# Patient Record
Sex: FEMALE | Race: BLACK OR AFRICAN AMERICAN | NOT HISPANIC OR LATINO | Employment: FULL TIME | URBAN - METROPOLITAN AREA
[De-identification: names, ages, dates, MRNs, and addresses within clinical notes are randomized per-mention and may not be internally consistent; named-entity substitution may affect disease eponyms.]

---

## 2018-09-17 ENCOUNTER — CLINICAL SUPPORT (OUTPATIENT)
Dept: URGENT CARE | Facility: CLINIC | Age: 28
End: 2018-09-17

## 2018-09-17 DIAGNOSIS — Z00.00 PHYSICAL EXAM: Primary | ICD-10-CM

## 2018-09-17 PROCEDURE — 86580 TB INTRADERMAL TEST: CPT | Mod: S$GLB,,, | Performed by: PREVENTIVE MEDICINE

## 2018-09-17 PROCEDURE — 90715 TDAP VACCINE 7 YRS/> IM: CPT | Mod: S$GLB,,, | Performed by: PREVENTIVE MEDICINE

## 2018-09-17 PROCEDURE — 86706 HEP B SURFACE ANTIBODY: CPT | Mod: S$GLB,,, | Performed by: NURSE PRACTITIONER

## 2018-09-17 PROCEDURE — 99002 DEVICE HANDLING PHYS/QHP: CPT | Mod: S$GLB,,, | Performed by: NURSE PRACTITIONER

## 2018-09-17 PROCEDURE — 86799 MMRV TITER: CPT | Mod: S$GLB,,, | Performed by: NURSE PRACTITIONER

## 2018-09-17 PROCEDURE — 99080 SPECIAL REPORTS OR FORMS: CPT | Mod: S$GLB,,, | Performed by: NURSE PRACTITIONER

## 2018-09-20 LAB
TB INDURATION - 48 HR READ: NORMAL MM
TB INDURATION - 72 HR READ: NORMAL MM
TB SKIN TEST - 48 HR READ: NORMAL
TB SKIN TEST - 72 HR READ: NEGATIVE

## 2018-09-24 ENCOUNTER — CLINICAL SUPPORT (OUTPATIENT)
Dept: URGENT CARE | Facility: CLINIC | Age: 28
End: 2018-09-24

## 2018-09-24 DIAGNOSIS — Z23 ENCOUNTER FOR IMMUNIZATION: Primary | ICD-10-CM

## 2018-09-24 PROCEDURE — 90746 HEPB VACCINE 3 DOSE ADULT IM: CPT | Mod: S$GLB,,, | Performed by: PREVENTIVE MEDICINE

## 2018-09-24 PROCEDURE — 90707 MMR VACCINE SC: CPT | Mod: S$GLB,,, | Performed by: PREVENTIVE MEDICINE

## 2019-05-08 ENCOUNTER — INITIAL CONSULT (OUTPATIENT)
Dept: MATERNAL FETAL MEDICINE | Facility: CLINIC | Age: 29
End: 2019-05-08
Payer: MEDICAID

## 2019-05-08 ENCOUNTER — LAB VISIT (OUTPATIENT)
Dept: LAB | Facility: OTHER | Age: 29
End: 2019-05-08
Attending: PEDIATRICS
Payer: MEDICAID

## 2019-05-08 VITALS — WEIGHT: 117.94 LBS | SYSTOLIC BLOOD PRESSURE: 108 MMHG | DIASTOLIC BLOOD PRESSURE: 66 MMHG

## 2019-05-08 DIAGNOSIS — O24.410 DIET CONTROLLED GESTATIONAL DIABETES MELLITUS (GDM) IN SECOND TRIMESTER: ICD-10-CM

## 2019-05-08 DIAGNOSIS — Z36.89 ENCOUNTER FOR FETAL ANATOMIC SURVEY: Primary | ICD-10-CM

## 2019-05-08 LAB
ALBUMIN SERPL BCP-MCNC: 3.1 G/DL (ref 3.5–5.2)
ALP SERPL-CCNC: 74 U/L (ref 55–135)
ALT SERPL W/O P-5'-P-CCNC: 13 U/L (ref 10–44)
ANION GAP SERPL CALC-SCNC: 7 MMOL/L (ref 8–16)
AST SERPL-CCNC: 15 U/L (ref 10–40)
BASOPHILS # BLD AUTO: 0.01 K/UL (ref 0–0.2)
BASOPHILS NFR BLD: 0.1 % (ref 0–1.9)
BILIRUB SERPL-MCNC: 0.2 MG/DL (ref 0.1–1)
BUN SERPL-MCNC: 7 MG/DL (ref 6–20)
CALCIUM SERPL-MCNC: 9.4 MG/DL (ref 8.7–10.5)
CHLORIDE SERPL-SCNC: 107 MMOL/L (ref 95–110)
CO2 SERPL-SCNC: 23 MMOL/L (ref 23–29)
CREAT SERPL-MCNC: 0.5 MG/DL (ref 0.5–1.4)
DIFFERENTIAL METHOD: ABNORMAL
EOSINOPHIL # BLD AUTO: 0.1 K/UL (ref 0–0.5)
EOSINOPHIL NFR BLD: 0.4 % (ref 0–8)
ERYTHROCYTE [DISTWIDTH] IN BLOOD BY AUTOMATED COUNT: 14 % (ref 11.5–14.5)
EST. GFR  (AFRICAN AMERICAN): >60 ML/MIN/1.73 M^2
EST. GFR  (NON AFRICAN AMERICAN): >60 ML/MIN/1.73 M^2
ESTIMATED AVG GLUCOSE: 100 MG/DL (ref 68–131)
GLUCOSE SERPL-MCNC: 91 MG/DL (ref 70–110)
HBA1C MFR BLD HPLC: 5.1 % (ref 4–5.6)
HCT VFR BLD AUTO: 28.5 % (ref 37–48.5)
HGB BLD-MCNC: 9.6 G/DL (ref 12–16)
LYMPHOCYTES # BLD AUTO: 2.8 K/UL (ref 1–4.8)
LYMPHOCYTES NFR BLD: 24.2 % (ref 18–48)
MCH RBC QN AUTO: 30 PG (ref 27–31)
MCHC RBC AUTO-ENTMCNC: 33.7 G/DL (ref 32–36)
MCV RBC AUTO: 89 FL (ref 82–98)
MONOCYTES # BLD AUTO: 0.6 K/UL (ref 0.3–1)
MONOCYTES NFR BLD: 5.4 % (ref 4–15)
NEUTROPHILS # BLD AUTO: 8 K/UL (ref 1.8–7.7)
NEUTROPHILS NFR BLD: 69.5 % (ref 38–73)
PLATELET # BLD AUTO: 393 K/UL (ref 150–350)
PMV BLD AUTO: 9.1 FL (ref 9.2–12.9)
POTASSIUM SERPL-SCNC: 3.7 MMOL/L (ref 3.5–5.1)
PROT SERPL-MCNC: 6.2 G/DL (ref 6–8.4)
RBC # BLD AUTO: 3.2 M/UL (ref 4–5.4)
SODIUM SERPL-SCNC: 137 MMOL/L (ref 136–145)
WBC # BLD AUTO: 11.46 K/UL (ref 3.9–12.7)

## 2019-05-08 PROCEDURE — 99213 OFFICE O/P EST LOW 20 MIN: CPT | Mod: PBBFAC,TH | Performed by: PEDIATRICS

## 2019-05-08 PROCEDURE — 83036 HEMOGLOBIN GLYCOSYLATED A1C: CPT

## 2019-05-08 PROCEDURE — 80053 COMPREHEN METABOLIC PANEL: CPT

## 2019-05-08 PROCEDURE — 99999 PR PBB SHADOW E&M-EST. PATIENT-LVL III: ICD-10-PCS | Mod: PBBFAC,,, | Performed by: PEDIATRICS

## 2019-05-08 PROCEDURE — 85025 COMPLETE CBC W/AUTO DIFF WBC: CPT

## 2019-05-08 PROCEDURE — 99999 PR PBB SHADOW E&M-EST. PATIENT-LVL III: CPT | Mod: PBBFAC,,, | Performed by: PEDIATRICS

## 2019-05-08 PROCEDURE — 36415 COLL VENOUS BLD VENIPUNCTURE: CPT

## 2019-05-08 PROCEDURE — 99203 PR OFFICE/OUTPT VISIT, NEW, LEVL III, 30-44 MIN: ICD-10-PCS | Mod: S$PBB,TH,, | Performed by: PEDIATRICS

## 2019-05-08 PROCEDURE — 99203 OFFICE O/P NEW LOW 30 MIN: CPT | Mod: S$PBB,TH,, | Performed by: PEDIATRICS

## 2019-05-08 RX ORDER — BLOOD-GLUCOSE METER
EACH MISCELLANEOUS
Refills: 3 | COMMUNITY
Start: 2019-05-03

## 2019-05-08 RX ORDER — CETIRIZINE HYDROCHLORIDE 10 MG/1
TABLET ORAL
Refills: 0 | COMMUNITY
Start: 2019-01-31 | End: 2019-05-13

## 2019-05-08 NOTE — NURSING
Patient to [facility] for her consultation for her gestational diabetes in pregnancy. Patient is currently diet controlled.    Fetal heart tones confirmed via doppler 140 - 147bpm.    Patient currently transcribing her blood sugar log for Dr. Rangel to review.    Patient scheduled for her Diabetes Education and Anatomy Ultrasound Monday, May 13th at [facility]. Patient instructed to bring her blood sugar log at that appointment for Dr. Rangel to review.    Patient also scheduled for the following lab work:    - Protein/Creatine Ratio  - CBC  - CMP  - Hemoglobin A1c

## 2019-05-08 NOTE — LETTER
May 16, 2019      Eva Seo NP  [address]  [address]  [facility] [address]           [facility] [address]  [address]  [facility] [address]  Phone: [phone]          Patient: Diane Barroso   MR Number: [id number]   YOB: 1990   Date of Visit: 5/8/2019       Dear Eva Seo:    Thank you for referring Diane Barroso to me for evaluation. Attached you will find relevant portions of my assessment and plan of care.    If you have questions, please do not hesitate to call me. I look forward to following Diane Barroso along with you.    Sincerely,    Chapis Rangel MD    Enclosure  CC:  No Recipients    If you would like to receive this communication electronically, please contact [email or web address] or [phone] to request more information on [employer] Link access.    For providers and/or their staff who would like to refer a patient to [facility], please contact us through our one-stop-shop provider referral line, [facility], at [phone].    If you feel you have received this communication in error or would no longer like to receive these types of communications, please e-mail [email or web address]

## 2019-05-13 ENCOUNTER — INITIAL CONSULT (OUTPATIENT)
Dept: MATERNAL FETAL MEDICINE | Facility: CLINIC | Age: 29
End: 2019-05-13
Payer: MEDICAID

## 2019-05-13 ENCOUNTER — HOSPITAL ENCOUNTER (OUTPATIENT)
Dept: DIABETES | Facility: OTHER | Age: 29
Discharge: HOME | End: 2019-05-13
Attending: PEDIATRICS
Payer: MEDICAID

## 2019-05-13 ENCOUNTER — PROCEDURE VISIT (OUTPATIENT)
Dept: MATERNAL FETAL MEDICINE | Facility: CLINIC | Age: 29
End: 2019-05-13
Payer: MEDICAID

## 2019-05-13 VITALS
SYSTOLIC BLOOD PRESSURE: 102 MMHG | WEIGHT: 114 LBS | DIASTOLIC BLOOD PRESSURE: 58 MMHG | HEIGHT: 61 IN | BODY MASS INDEX: 21.52 KG/M2

## 2019-05-13 VITALS — HEIGHT: 61 IN | BODY MASS INDEX: 21.72 KG/M2 | WEIGHT: 115.06 LBS

## 2019-05-13 DIAGNOSIS — Z36.89 ENCOUNTER FOR FETAL ANATOMIC SURVEY: ICD-10-CM

## 2019-05-13 DIAGNOSIS — O24.419 GESTATIONAL DIABETES MELLITUS (GDM) IN THIRD TRIMESTER, GESTATIONAL DIABETES METHOD OF CONTROL UNSPECIFIED: Primary | ICD-10-CM

## 2019-05-13 DIAGNOSIS — O24.410 DIET CONTROLLED GESTATIONAL DIABETES MELLITUS (GDM) IN SECOND TRIMESTER: ICD-10-CM

## 2019-05-13 DIAGNOSIS — Z36.89 ENCOUNTER FOR ULTRASOUND TO CHECK FETAL GROWTH: Primary | ICD-10-CM

## 2019-05-13 PROCEDURE — 99213 OFFICE O/P EST LOW 20 MIN: CPT | Mod: S$PBB,TH,25, | Performed by: PEDIATRICS

## 2019-05-13 PROCEDURE — G0108 DIAB MANAGE TRN  PER INDIV: HCPCS | Performed by: DIETITIAN, REGISTERED

## 2019-05-13 PROCEDURE — 76811 OB US DETAILED SNGL FETUS: CPT | Mod: PBBFAC | Performed by: PEDIATRICS

## 2019-05-13 PROCEDURE — 99999 PR PBB SHADOW E&M-EST. PATIENT-LVL III: ICD-10-PCS | Mod: PBBFAC,,, | Performed by: PEDIATRICS

## 2019-05-13 PROCEDURE — 99999 PR PBB SHADOW E&M-EST. PATIENT-LVL III: CPT | Mod: PBBFAC,,, | Performed by: PEDIATRICS

## 2019-05-13 PROCEDURE — 76811 OB US DETAILED SNGL FETUS: CPT | Mod: 26,S$PBB,, | Performed by: PEDIATRICS

## 2019-05-13 PROCEDURE — 99213 OFFICE O/P EST LOW 20 MIN: CPT | Mod: PBBFAC,TH | Performed by: PEDIATRICS

## 2019-05-13 PROCEDURE — 99213 PR OFFICE/OUTPT VISIT, EST, LEVL III, 20-29 MIN: ICD-10-PCS | Mod: S$PBB,TH,25, | Performed by: PEDIATRICS

## 2019-05-13 PROCEDURE — 76811 PR US, OB FETAL EVAL & EXAM, TRANSABDOM,FIRST GESTATION: ICD-10-PCS | Mod: 26,S$PBB,, | Performed by: PEDIATRICS

## 2019-05-13 NOTE — PROGRESS NOTES
Diabetes Education  Author: Rosana Smith RD  Date: 2019    Diabetes Care Management Summary  Diabetes Education Record Assessment/Progress: Initial         Diabetes Type  Diabetes Type : Gestational    Diabetes History  Diabetes Diagnosis: 0-1 year  Current Treatment: Diet    Health Maintenance was reviewed today with patient. Discussed with patient importance of routine eye exams, foot exams/foot care, blood work (i.e.: A1c, microalbumin, and lipid), dental visits, yearly flu vaccine, and pneumonia vaccine as indicated by PCP. Patient verbalized understanding.     Health Maintenance Topics with due status: Not Due       Topic Last Completion Date    TETANUS VACCINE 2018    Influenza Vaccine Not Due     Health Maintenance Due   Topic Date Due    Lipid Panel  1988    Pneumococcal Vaccine (Medium Risk) (1 of 1 - PPSV23) 2007    Pap Smear with HPV Cotest  2009       Nutrition  Meal Planning: water, 3 meals per day, drinks regular soda, eats out often(cooks steak, red gravy, chicken, potatoes, rice, avoids fried meats. )  What type of sweetener do you use?: sugar  What type of beverages do you drink?: regular soda/tea, water  Meal Plan 24 Hour Recall - Breakfast: Hot cakes and sausage with syrup and butter with a coke  from [employer]   Meal Plan 24 Hour Recall - Lunch: cheese burger and fries with a sprite from [employer]  Meal Plan 24 Hour Recall - Dinner: Fillet mignon, loaded baked potatoe, simin salad with water  Meal Plan 24 Hour Recall - Snack: candy bars, some fruit when available, little debbies    Monitoring   Self Monitoring : SMBG Fastin,96,94,95,94, 93,95  PP:112,94,116,141,103  Blood Glucose Logs: Yes  Do you use a personal continuous glucose monitor?: No  In the last month, how often have you had a low blood sugar reaction?: never  Can you tell when your blood sugar is too high?: no    Exercise   Frequency: Never    Current Diabetes Treatment   Current Treatment:  Diet    Social History  Preferred Learning Method: Face to Face  Primary Support: Spouse, Self  Educational Level: Trade School  Occupation: cosmetology  Smoking Status: Current Smoker(1/2 PPD)  Alcohol Use: Never                                Barriers to Change  Barriers to Change: None  Learning Challenges : None    Readiness to Learn   Readiness to Learn : Acceptance    Cultural Influences  Cultural Influences: No    Diabetes Education Assessment/Progress  Diabetes Disease Process (diabetes disease process and treatment options): Discussion, Instructed, Needs Instruction, Individual Session, Written Materials Provided(Ed on role of insulin in controlling Bg )  Nutrition (Incorporating nutritional management into one's lifestyle): Discussion, Instructed, Demonstration, Return Demonstration, Needs Instruction, Individual Session, Written Materials Provided(Diet high in added sugar and fast foods. Ed on carb counting, label reading & meal and snack planning. She is eagar to controll her BG through diet)  Physical Activity (incorporating physical activity into one's lifestyle): Discussion, Instructed, Needs Instruction, Individual Session, Written Materials Provided(Ed on the benefits and precautions to take when exercising during pregnancy. She agrees to walk for 20-30 minutes 5 times per week.)  Medications (states correct name, dose, onset, peak, duration, side effects & timing of meds): Discussion, Individual Session, Not Applicable(Diet controlled)  Monitoring (monitoring blood glucose/other parameters & using results): Discussion, Demonstration, Return Demonstration, Instructed, Needs Review, Individual Session, Written Materials Provided(SMBG prior to apmtn. Ed on target BG ranges proper technique and sharps disposal. STressed SMBG 4 times daily and bring logs to all clinic visits. )  Acute Complications (preventing, detecting, and treating acute complications): Discussion, Instructed, Needs Instruction,  Individual Session, Written Materials Provided(Ed on causes, s/s and Tx for hypo and hyperglycemia. )  Chronic Complications (preventing, detecting, and treating chronic complications): Discussion, Instructed, Needs Instruction, Individual Session, Written Materials Provided(Ed on tight BG control to prevent/reduce risks of complications during and after pregnancy. Ed on Type 2 DM prevention. )  Clinical (diabetes, other pertinent medical history, and relevant comorbidities reviewed during visit): Discussion, Instructed, Individual Session, Written Materials Provided, Needs Review(Reviewed GTT results with goal range)  Cognitive (knowledge of self-management skills, functional health literacy): Discussion, Instructed, Needs Instruction, Individual Session, Written Materials Provided  Psychosocial (emotional response to diabetes): Discussion, Instructed, Needs Review, Individual Session, Written Materials Provided(Pt accepting of GDM diagnosis)  Diabetes Distress and Support Systems: Instructed, Discussion, Individual Session, Written Materials Provided, Needs Review(Pt has a supportive spouse and family)  Behavioral (readiness for change, lifestyle practices, self-care behaviors): Discussion, Instructed, Needs Instruction, Individual Session, Written Materials Provided    Goals  Patient has selected/evaluated goals during today's session: Yes, selected  Healthy Eating: Set(limit carbs to 30-45 g at breakfast and 45-60g at lunch and dinner. Omit sweet beverags. )  Start Date: 05/13/19  Target Date: 05/20/19  Monitoring: Set(SMBG 4 times daily and bring logs to all clinic visits)  Start Date: 05/13/19  Target Date: 05/20/19         Diabetes Care Plan/Intervention  Education Plan/Intervention: Individual Follow-Up DSMT    Diabetes Meal Plan  Restrictions: Restricted Carbohydrate  Calories: 2000  Fat: 88-97g  Protein: 88-99g    Today's Self-Management Care Plan was developed with the patient's input and is based on  barriers identified during today's assessment.    The long and short-term goals in the care plan were written with the patient/caregiver's input. The patient has agreed to work toward these goals to improve her overall diabetes control.      The patient received a copy of today's self-management plan and verbalized understanding of the care plan, goals, and all of today's instructions.      The patient was encouraged to communicate with her physician and care team regarding her condition(s) and treatment.  I provided the patient with my contact information today and encouraged her to contact me via phone or patient portal as needed.     Education Units of Time   Time Spent: 60 min

## 2019-05-16 NOTE — PROGRESS NOTES
Ms. Barroso is a 28 yo  female at 27-4/7 weeks.  She is sent for instructions regarding GDM.  Patient had a 1 hr of 200.  She has a history of diet-controlled diabetes-gestational diabetes-in last pregnancy which ended in .    OB History:  1.  2007:  40 week  of female infant at 7 lb 9 oz  2.  2009:  40 week  of male infant at 7 lb 11 oz.  Diet-controlled GDM  3.  :  An SAB at 6 weeks without D&C        Medical History: GDM.  Otherwise negative.    NKDA    Medications:  PNV    Surgical History: Salpingo oophorectomy.    Social History:  Smokes 1/2 ppd.    Prenatal Screen: Illumina screen negative      Gestational diabetes is marked by carbohydrate intolerance in pregnancy.  Risk factors for gestational diabetes include a history of gestational diabetes in prior pregnancy, obesity, prior macrosomia, recurrent UTIs or yeast infections, age >30, /SE //AA ethnicity, and prior fetal demise.  Diagnosis and treatment of gestational diabetes has been shown to decrease  mortality and morbidity.    I counseled the patient regarding the risks of gestational diabetes, which include macrosomia, polyhydramnios,  hypoglycemia, birth trauma, an increased risk of  delivery.  Patients requiring hypoglycemia agents have an increased risk of stillbirth.  She understands that  outcome is linked to her ability to achieve glycemic control.  The goal of treatment is to have a fasting blood sugar less than 90 mg/dL and 2 hour postprandials less than 120 mg/dL.  Approximately 15% of patients require hypoglycemia agents to achieve adequate control of their blood sugars.      Up to one third of woman who experienced gestational diabetes will have impaired glucose metabolism postpartum.  Postpartum glucose testing using a 75 g oral glucose tolerance test should be performed at 6-12 weeks after delivery.    IMPRESSION:    1.  Intrauterine pregnancy  at 27-3/7 weeks'.  2.  Gestational diabetes         RECOMMENDATIONS:    1.  She has had nutritional counseling per patient in last pregnancy; it is ordered again.  She should be on a ~2200-calorie ADA diet.  2.  Diabetes education and instruction on how to monitor blood sugars is ordered. She was instructed to check a fasting and 2 hour postprandial blood sugar daily.  She will call or fax her blood sugars weekly so we can monitor her progress and determine if hypoglycemic agents are needed.  She understands that the goal of therapy as to achieve a fasting blood sugar less than 90 and 2 hour postprandials less than 120.  3.  She should have serial growth scans to monitor fetal growth and amniotic fluid index.  A scan should be performed at 37-38 weeks gestation to exclude macrosomia and determine further delivery management.  4.  Weekly antepartum testing is indicated beginning at 32 weeks gestation if on no meds.  If medication required; twice weekly APT will be ordered.  5.  Postpartum glucose testing in 6-12 weeks postpartum using a 75 g oral glucose tolerance test.      I spent 30 minutes in counseling and coordination of care with >50% spent in face to face discussion.

## 2019-05-16 NOTE — PROGRESS NOTES
"Indication  ========    Fetal targeted anatomy survey.    History  ======    Previous Outcomes  Preg. no. 1  Outcome: Live birth  Preg. no. 2  Outcome: Live birth  Preg. no. 3  Details: SAB   4  Para 2  Ritter living children (L) 2    Maternal Assessment  =================    Weight 52 kg  Weight (lb) 115 lb  BP syst 102 mmHg  BP diast 58 mmHg    Method  ======    Transabdominal ultrasound examination, 2D Color Doppler, Voluson E10. View: Suboptimal view: limited by fetal position.    Pregnancy  =========    Ritter pregnancy. Number of fetuses: 1.    Dating  ======    GA by "stated dating" 28 w + 2 d  GUILLERMINA by "stated dating": 8/3/2019  Ultrasound examination on: 2019  GA by U/S based upon: AC, BPD, Femur, HC  GA by U/S 27 w + 4 d  GUILLERMINA by U/S: 2019  Assigned: Dating performed on 2019, based on the external assessment  Assigned GA 28 w + 2 d  Assigned GUILLERMINA: 8/3/2019    General Evaluation  ==============    Cardiac activity: present.  bpm.  Fetal movements: visualized.  Presentation: breech.  Placenta: anterior.  Umbilical cord: Cord vessels: 3 vessel cord. Cord insertion: placental insertion: normal.  Amniotic fluid: MVP 4.2 cm.    Fetal Biometry  ============    Fetal Biometry  BPD 64.3 mm 26w 0d Hadlock  OFD 98.2 mm 31w 5d Rudi  .9 mm 28w 3d Hadlock  .5 mm 27w 5d Hadlock  Femur 52.9 mm 28w 1d Hadlock  Cerebellum tr 35.0 mm 30w 5d Teague  CM 8.4 mm  Humerus 48.2 mm 28w 2d Rudi  EFW 1,129 g 35% Davide  Calculated by: Hadlock (BPD-HC-AC-FL)  EFW (lb) 2 lb  EFW (oz) 8 oz  Cephalic index 0.65  HC / AC 1.12  FL / BPD 0.82  FL / AC 0.23  MVP 4.2 cm   bpm  Head / Face / Neck   2.2 mm    Fetal Anatomy  ===========    Cranium: normal  Lateral ventricles: normal  Choroid plexus: normal  Midline falx: normal  Cavum septi pellucidi: normal  Cerebellum: normal  Cisterna magna: normal  Lips: normal  Profile: suboptimal  Nose: normal  4-chamber " view: normal  RVOT: normal  LVOT: normal  Aortic arch: normal  Ductal arch: suboptimal  SVC: normal  IVC: normal  3-vessel view: normal  3-vessel-trachea view: normal  Rt lung: normal  Lt lung: normal  Diaphragm: normal  Cord insertion: normal  Stomach: normal  Kidneys: normal  Bladder: normal  Genitals: normal  Abdom. wall: normal  Abdom. cavity: normal  Rt kidney: normal  Lt kidney: normal  Liver: normal  Bowel: normal  Rt renal artery: normal  Lt renal artery: normal  Cervical spine: normal  Thoracic spine: normal  Lumbar spine: suboptimal  Sacral spine: suboptimal  Arms: normal  Legs: normal  Rt arm: normal  Lt arm: normal  Rt hand: visualized  Lt hand: visualized  Rt leg: normal  Lt leg: normal  Rt foot: visualized  Lt foot: visualized  Position of hands: normal  Position of feet: normal    Maternal Structures  ===============    Ovaries / Tubes / Adnexa  Rt ovary: Visualized  Lt ovary: Not visualized            Consultation  ==========    Patient returns for anatomic scan. She met with the dietitian today and the diabetic educator. She does not have her blood sugar log. She was  unable to check any blood sugars over the weekend as she did not have a glucometer over the weekend.    I reviewed the ultrasound with her which shows the baby at 35th percentile. No abnormalities were seen; however the study was incomplete. It  is noted that the Hgb A1c done at initial consult was 5.1%. This makes the diagnosis of gestational diabetes more likely than type 2 diabetes.  Additionally, it is assumes that the risk of diabetic embryopathy is not increased over persons not diagnosed with diabetes in pregnancy.    See ultrasound report below.      I spent 15 min in patient care management and consultation with greater than 50% face-to-face.          Impression  =========    A basic fetal anatomic ultrasound examination was performed for the following high risk indication: GDM    No fetal structural malformations are  identified; however, fetal imaging is incomplete today. A follow-up study will be scheduled to complete the  fetal anatomic survey.    Fetal size today is consistent with established gestational age.  Cervical length is normal.  Placental location is normal without evidence of previa.            Recommendation  ==============    We recommend evaluation in 4 weeks.      Thank you again for allowing us to participate in the care of your patients. If you have any questions concerning today's consultation, feel free  to contact me or one of my partners. We can be reached at [phone] during normal business hours. If you have a question after normal  business hours, please contact Labor and Delivery at [phone].

## 2019-06-17 ENCOUNTER — INITIAL CONSULT (OUTPATIENT)
Dept: MATERNAL FETAL MEDICINE | Facility: CLINIC | Age: 29
End: 2019-06-17
Payer: MEDICAID

## 2019-06-17 ENCOUNTER — PROCEDURE VISIT (OUTPATIENT)
Dept: MATERNAL FETAL MEDICINE | Facility: CLINIC | Age: 29
End: 2019-06-17
Payer: MEDICAID

## 2019-06-17 VITALS — DIASTOLIC BLOOD PRESSURE: 54 MMHG | SYSTOLIC BLOOD PRESSURE: 99 MMHG | BODY MASS INDEX: 22.33 KG/M2 | WEIGHT: 118.19 LBS

## 2019-06-17 DIAGNOSIS — O24.410 DIET CONTROLLED GESTATIONAL DIABETES MELLITUS (GDM) IN SECOND TRIMESTER: Primary | ICD-10-CM

## 2019-06-17 DIAGNOSIS — Z36.89 ENCOUNTER FOR ULTRASOUND TO CHECK FETAL GROWTH: ICD-10-CM

## 2019-06-17 PROCEDURE — 99213 OFFICE O/P EST LOW 20 MIN: CPT | Mod: PBBFAC,TH | Performed by: PEDIATRICS

## 2019-06-17 PROCEDURE — 76816 PR  US,PREGNANT UTERUS,F/U,TRANSABD APP: ICD-10-PCS | Mod: 26,S$PBB,, | Performed by: PEDIATRICS

## 2019-06-17 PROCEDURE — 76819 FETAL BIOPHYS PROFIL W/O NST: CPT | Mod: PBBFAC | Performed by: PEDIATRICS

## 2019-06-17 PROCEDURE — 99999 PR PBB SHADOW E&M-EST. PATIENT-LVL III: ICD-10-PCS | Mod: PBBFAC,,, | Performed by: PEDIATRICS

## 2019-06-17 PROCEDURE — 76820 UMBILICAL ARTERY ECHO: CPT | Mod: PBBFAC | Performed by: PEDIATRICS

## 2019-06-17 PROCEDURE — 76820 PR US, OB DOPPLER, FETAL UMBILICAL ARTERY ECHO: ICD-10-PCS | Mod: 26,S$PBB,, | Performed by: PEDIATRICS

## 2019-06-17 PROCEDURE — 99214 OFFICE O/P EST MOD 30 MIN: CPT | Mod: S$PBB,TH,25, | Performed by: PEDIATRICS

## 2019-06-17 PROCEDURE — 76816 OB US FOLLOW-UP PER FETUS: CPT | Mod: PBBFAC | Performed by: PEDIATRICS

## 2019-06-17 PROCEDURE — 76820 UMBILICAL ARTERY ECHO: CPT | Mod: 26,S$PBB,, | Performed by: PEDIATRICS

## 2019-06-17 PROCEDURE — 99999 PR PBB SHADOW E&M-EST. PATIENT-LVL III: CPT | Mod: PBBFAC,,, | Performed by: PEDIATRICS

## 2019-06-17 PROCEDURE — 76819 FETAL BIOPHYS PROFIL W/O NST: CPT | Mod: 26,S$PBB,, | Performed by: PEDIATRICS

## 2019-06-17 PROCEDURE — 76816 OB US FOLLOW-UP PER FETUS: CPT | Mod: 26,S$PBB,, | Performed by: PEDIATRICS

## 2019-06-17 PROCEDURE — 76819 PR US, OB, FETAL BIOPHYSICAL, W/O NST: ICD-10-PCS | Mod: 26,S$PBB,, | Performed by: PEDIATRICS

## 2019-06-17 PROCEDURE — 99214 PR OFFICE/OUTPT VISIT, EST, LEVL IV, 30-39 MIN: ICD-10-PCS | Mod: S$PBB,TH,25, | Performed by: PEDIATRICS

## 2019-06-17 NOTE — PROGRESS NOTES
Indication  ========    F/U Consultation. Follow-up evaluation of anatomy. Follow-up evaluation for fetal growth    History  ======    Previous Outcomes  Preg. no. 1  Outcome: live birth  Preg. no. 2  Outcome: live birth  Preg. no. 3  Details: SAB  Risk Factors  Details: Gestational Diabetes    Maternal Assessment  =================    Weight 54 kg  Weight (lb) 119 lb  BP syst 99 mmHg  BP diast 54 mmHg    Method  ======    Transabdominal ultrasound examination, Voluson E10. View: Suboptimal view: limited by fetal position    Pregnancy  =========    Ritter pregnancy. Number of fetuses: 1    Dating  ======    GA by prior assessment 33 w + 2 d  GUILLERMINA by prior assessment: 8/3/2019  Ultrasound examination on: 6/17/2019  GA by U/S based upon: AC, BPD, Femur, HC  GA by U/S 31 w + 1 d  GUILLERMINA by U/S: 8/18/2019  Assigned: Dating performed on 05/13/2019, based on the external assessment  Assigned GA 33 w + 2 d  Assigned GUILLERMINA: 8/3/2019  Pregnancy length 280 d    General Evaluation  ==============    Cardiac activity present.  bpm.  Fetal movements visualized.  Presentation cephalic.  Placenta Placental site: anterior.  Amniotic fluid Amount of AF: normal amount. MVP 6.3 cm.    Biophysical Profile  ==============    2: Fetal breathing movements  2: Gross body movements  2: Fetal tone  2: Amniotic fluid volume  8/8 Biophysical profile score  Interpretation: normal    Fetal Biometry  ============    Standard  BPD 74.3 mm  29w 6d                Hadlock    .5 mm  33w 2d                Rudi    .0 mm  31w 1d                Hadlock    .8 mm  31w 5d                Hadlock    Femur 61.3 mm  31w 6d                Hadlock    HC / AC 1.03    EFW 1,777 g          10%        Davide    EFW (lb) 3 lb  EFW (oz) 15 oz  EFW by: Hadlock (BPD-HC-AC-FL)  Head / Face / Neck  Cephalic index 0.72    Extremities / Bony Struc  FL / BPD 0.83    FL / AC 0.22    Other Structures   bpm    Fetal  Anatomy  ============    Cranium: normal  Profile: normal  4-chamber view: normal  Heart / Thorax  Ductal arch view: suboptimal  Stomach: normal  Kidneys: normal  Bladder: normal  Lumbar spine: normal  Sacral spine: normal    Fetal Doppler  ===========    Arterial  Umbilical A PI 0.86          46%        Neisha    Umbilical A RI 0.57          35%        Neisha    Umbilical A PS -37.36 cm/s    Umbilical A ED -16.31 cm/s  Umbilical A TAmax -25.46 cm/s    Umbilical A MD -15.92 cm/s  Umbilical A S / D 2.33          31%        Neisha    Umbilical A  bpm  Impression: normal umbilical artery blood flow (S/D ratio)          Consultation  ==========    Ms. Barroso returns for follow-up of anatomy and blood sugar examination. She has no complaints. She is currently on no medications. She did  meet with Diabetic Education per patient.    I reviewed her blood sugar log. There are a few elevated FBS. She states that she often gets up in the middle of the night and has a 'snack'. I  asked her to please note when she does so, and I also asked her to carefully monitor her CHO intake. She has been eating pancakes for  breakfast and nothing else because 'I am not supposed to eat steward and eggs when diabetic, right?' I assured her that the latter was likely  better than the former. We reviewed other dietary issues. I did not initiate medications.    We also discussed fetal growth. The fetus is at the 10th percentile. It should be noted that she is 5' and very petite. We talked about adequate  protein intake.      Recommendations:  1. Start weekly PNT.  2. Growth in 3 weeks.  3. Watch fetal growth.  4. Consider Metformin if >20% of BS elevated.        I spent 25 min in patient care management and consultation greater than 50% face-to-face.        Impression  =========    Fetal size is AGA with the EFW at the 10th percentile (at cut off for IUGR).    Normal repeat limited fetal anatomic survey.  AFV is normal.    BPP 8 of  8.  Fetal UA Doppler S/D ratio is wnl.      Recommendation  ==============      Thank you again for allowing us to participate in the care of your patients. If you have any questions concerning today's consultation, feel free  to contact me or one of my partners. We can be reached at [phone] during normal business hours. If you have a question after normal  business hours, please contact Labor and Delivery at [phone].

## 2019-06-17 NOTE — PROGRESS NOTES
After ultrasound in [facility] and consult with Dr. Rangel pt set up for weekly BPP's in Prenatal testing per Dr. Rangel for Gestational DM, pt then will come to [facility] after BPP's to review blood sugar logs with Dr. Rangel, pt requested to please make her appts on Wednesdays due to  being off of work and able to attend with her, appts scheduled, pt given new blood sugar logs for future use and a copy of her current blood sugar log made to file in her chart, pt does not take any medication at this time for blood sugar management, pt is diet controlled, pt understands to bring blood sugar logs to each MD appt and has no further questions.

## 2019-06-26 ENCOUNTER — HOSPITAL ENCOUNTER (OUTPATIENT)
Dept: PERINATAL CARE | Facility: OTHER | Age: 29
Discharge: HOME | End: 2019-06-26
Attending: PEDIATRICS
Payer: MEDICAID

## 2019-06-26 ENCOUNTER — INITIAL CONSULT (OUTPATIENT)
Dept: MATERNAL FETAL MEDICINE | Facility: CLINIC | Age: 29
End: 2019-06-26
Payer: MEDICAID

## 2019-06-26 ENCOUNTER — TELEPHONE (OUTPATIENT)
Dept: MATERNAL FETAL MEDICINE | Facility: CLINIC | Age: 29
End: 2019-06-26

## 2019-06-26 VITALS
SYSTOLIC BLOOD PRESSURE: 105 MMHG | WEIGHT: 118.81 LBS | BODY MASS INDEX: 22.45 KG/M2 | DIASTOLIC BLOOD PRESSURE: 62 MMHG

## 2019-06-26 DIAGNOSIS — Z36.89 ENCOUNTER FOR ULTRASOUND TO ASSESS FETAL GROWTH: Primary | ICD-10-CM

## 2019-06-26 DIAGNOSIS — O24.410 DIET CONTROLLED GESTATIONAL DIABETES MELLITUS (GDM) IN SECOND TRIMESTER: ICD-10-CM

## 2019-06-26 PROCEDURE — 76819 PR US, OB, FETAL BIOPHYSICAL, W/O NST: ICD-10-PCS | Mod: 26,,, | Performed by: OBSTETRICS & GYNECOLOGY

## 2019-06-26 PROCEDURE — 99999 PR PBB SHADOW E&M-EST. PATIENT-LVL III: ICD-10-PCS | Mod: PBBFAC,,, | Performed by: PEDIATRICS

## 2019-06-26 PROCEDURE — 99212 OFFICE O/P EST SF 10 MIN: CPT | Mod: S$PBB,TH,, | Performed by: PEDIATRICS

## 2019-06-26 PROCEDURE — 99213 OFFICE O/P EST LOW 20 MIN: CPT | Mod: PBBFAC,25,TH | Performed by: PEDIATRICS

## 2019-06-26 PROCEDURE — 99212 PR OFFICE/OUTPT VISIT, EST, LEVL II, 10-19 MIN: ICD-10-PCS | Mod: S$PBB,TH,, | Performed by: PEDIATRICS

## 2019-06-26 PROCEDURE — 76819 FETAL BIOPHYS PROFIL W/O NST: CPT | Mod: 26,,, | Performed by: OBSTETRICS & GYNECOLOGY

## 2019-06-26 PROCEDURE — 99999 PR PBB SHADOW E&M-EST. PATIENT-LVL III: CPT | Mod: PBBFAC,,, | Performed by: PEDIATRICS

## 2019-06-26 PROCEDURE — 76819 FETAL BIOPHYS PROFIL W/O NST: CPT

## 2019-06-26 NOTE — PROGRESS NOTES
Pt presents to [facility] after ultrasound done in PNT today for blood sugar review with Dr. Rangel, pt has blood sugar log, copy made for MD to review, pt states she is diet controlled for her GDM, pt has no further questions.

## 2019-06-26 NOTE — TELEPHONE ENCOUNTER
Called pt to book a growth ultrasound and BPP in [facility] for Wednesday July 17th (pt has BPP's already scheduled in PNT once a week on Wednesdays) pt notified Dr. Rangel will not be in clinic that day and she can see Dr. Gallegos's partner - Dr. Li, pt ok with this change for this appointment, pt aware to continue to bring all blood sugar logs to each MD appt, pt aware of appt date, time, and location and has no further questions, will mail appt slip.

## 2019-07-03 ENCOUNTER — HOSPITAL ENCOUNTER (OUTPATIENT)
Dept: PERINATAL CARE | Facility: OTHER | Age: 29
Discharge: HOME | End: 2019-07-03
Attending: PEDIATRICS
Payer: MEDICAID

## 2019-07-03 ENCOUNTER — INITIAL CONSULT (OUTPATIENT)
Dept: MATERNAL FETAL MEDICINE | Facility: CLINIC | Age: 29
End: 2019-07-03
Payer: MEDICAID

## 2019-07-03 VITALS — BODY MASS INDEX: 22.7 KG/M2 | DIASTOLIC BLOOD PRESSURE: 52 MMHG | WEIGHT: 120.13 LBS | SYSTOLIC BLOOD PRESSURE: 102 MMHG

## 2019-07-03 DIAGNOSIS — O24.410 DIET CONTROLLED GESTATIONAL DIABETES MELLITUS (GDM) IN THIRD TRIMESTER: Primary | ICD-10-CM

## 2019-07-03 DIAGNOSIS — O24.410 DIET CONTROLLED GESTATIONAL DIABETES MELLITUS (GDM) IN SECOND TRIMESTER: ICD-10-CM

## 2019-07-03 PROCEDURE — 99212 OFFICE O/P EST SF 10 MIN: CPT | Mod: S$PBB,TH,25, | Performed by: PEDIATRICS

## 2019-07-03 PROCEDURE — 99999 PR PBB SHADOW E&M-EST. PATIENT-LVL III: CPT | Mod: PBBFAC,,, | Performed by: PEDIATRICS

## 2019-07-03 PROCEDURE — 76819 FETAL BIOPHYS PROFIL W/O NST: CPT

## 2019-07-03 PROCEDURE — 99212 PR OFFICE/OUTPT VISIT, EST, LEVL II, 10-19 MIN: ICD-10-PCS | Mod: S$PBB,TH,25, | Performed by: PEDIATRICS

## 2019-07-03 PROCEDURE — 76819 PR US, OB, FETAL BIOPHYSICAL, W/O NST: ICD-10-PCS | Mod: 26,,, | Performed by: PEDIATRICS

## 2019-07-03 PROCEDURE — 76819 FETAL BIOPHYS PROFIL W/O NST: CPT | Mod: 26,,, | Performed by: PEDIATRICS

## 2019-07-03 PROCEDURE — 99213 OFFICE O/P EST LOW 20 MIN: CPT | Mod: PBBFAC,25,TH | Performed by: PEDIATRICS

## 2019-07-03 PROCEDURE — 99999 PR PBB SHADOW E&M-EST. PATIENT-LVL III: ICD-10-PCS | Mod: PBBFAC,,, | Performed by: PEDIATRICS

## 2019-07-08 NOTE — PROGRESS NOTES
Patient is seen following her PNT for BS review.  She is diet controlled.    BS log reviewed; >90% WNL.    No further review needed by MFM.      I spent 10 minutes in patient care management and consultation with greater than 50% face-to-face

## 2019-07-17 ENCOUNTER — PROCEDURE VISIT (OUTPATIENT)
Dept: MATERNAL FETAL MEDICINE | Facility: CLINIC | Age: 29
End: 2019-07-17
Payer: MEDICAID

## 2019-07-17 ENCOUNTER — TELEPHONE (OUTPATIENT)
Dept: MATERNAL FETAL MEDICINE | Facility: CLINIC | Age: 29
End: 2019-07-17

## 2019-07-17 DIAGNOSIS — O24.410 DIET CONTROLLED GESTATIONAL DIABETES MELLITUS (GDM) IN THIRD TRIMESTER: ICD-10-CM

## 2019-07-17 DIAGNOSIS — Z36.89 ENCOUNTER FOR ULTRASOUND TO ASSESS FETAL GROWTH: ICD-10-CM

## 2019-07-17 PROCEDURE — 76816 OB US FOLLOW-UP PER FETUS: CPT | Mod: PBBFAC | Performed by: OBSTETRICS & GYNECOLOGY

## 2019-07-17 PROCEDURE — 76816 OB US FOLLOW-UP PER FETUS: CPT | Mod: 26,S$PBB,, | Performed by: OBSTETRICS & GYNECOLOGY

## 2019-07-17 PROCEDURE — 76819 FETAL BIOPHYS PROFIL W/O NST: CPT | Mod: PBBFAC | Performed by: OBSTETRICS & GYNECOLOGY

## 2019-07-17 PROCEDURE — 76816 PR  US,PREGNANT UTERUS,F/U,TRANSABD APP: ICD-10-PCS | Mod: 26,S$PBB,, | Performed by: OBSTETRICS & GYNECOLOGY

## 2019-07-17 PROCEDURE — 76819 FETAL BIOPHYS PROFIL W/O NST: CPT | Mod: 26,S$PBB,, | Performed by: OBSTETRICS & GYNECOLOGY

## 2019-07-17 PROCEDURE — 76819 PR US, OB, FETAL BIOPHYSICAL, W/O NST: ICD-10-PCS | Mod: 26,S$PBB,, | Performed by: OBSTETRICS & GYNECOLOGY

## 2019-07-17 NOTE — TELEPHONE ENCOUNTER
Spoke with patient who had forgotten her blood sugar log for review today and scheduled her for weekly BPP ultrasounds in PNT. Asked patient to bring blood glucose log each week to PNT (for MD review). Pt verbalizes understanding.

## 2019-07-24 ENCOUNTER — HOSPITAL ENCOUNTER (OUTPATIENT)
Dept: PERINATAL CARE | Facility: OTHER | Age: 29
Discharge: HOME | End: 2019-07-24
Attending: PEDIATRICS
Payer: MEDICAID

## 2019-07-24 DIAGNOSIS — O24.410 DIET CONTROLLED GESTATIONAL DIABETES MELLITUS (GDM) IN SECOND TRIMESTER: ICD-10-CM

## 2019-07-24 PROCEDURE — 76819 FETAL BIOPHYS PROFIL W/O NST: CPT | Mod: 26,,, | Performed by: OBSTETRICS & GYNECOLOGY

## 2019-07-24 PROCEDURE — 76819 PR US, OB, FETAL BIOPHYSICAL, W/O NST: ICD-10-PCS | Mod: 26,,, | Performed by: OBSTETRICS & GYNECOLOGY

## 2019-07-24 PROCEDURE — 76819 FETAL BIOPHYS PROFIL W/O NST: CPT

## 2022-03-10 NOTE — PROGRESS NOTES
Reassuring BPP 8/8  See report in imaging tab   Quality 130: Documentation Of Current Medications In The Medical Record: Current Medications Documented Quality 226: Preventive Care And Screening: Tobacco Use: Screening And Cessation Intervention: Patient screened for tobacco use and is an ex/non-smoker Detail Level: Detailed Quality 111:Pneumonia Vaccination Status For Older Adults: Pneumococcal Vaccination Previously Received